# Patient Record
Sex: FEMALE | Race: WHITE | NOT HISPANIC OR LATINO | URBAN - METROPOLITAN AREA
[De-identification: names, ages, dates, MRNs, and addresses within clinical notes are randomized per-mention and may not be internally consistent; named-entity substitution may affect disease eponyms.]

---

## 2021-01-19 ENCOUNTER — NURSE TRIAGE (OUTPATIENT)
Dept: OTHER | Facility: OTHER | Age: 51
End: 2021-01-19

## 2021-01-19 DIAGNOSIS — Z20.822 EXPOSURE TO COVID-19 VIRUS: Primary | ICD-10-CM

## 2021-01-20 DIAGNOSIS — Z20.822 EXPOSURE TO COVID-19 VIRUS: ICD-10-CM

## 2021-01-20 PROCEDURE — U0005 INFEC AGEN DETEC AMPLI PROBE: HCPCS | Performed by: FAMILY MEDICINE

## 2021-01-20 PROCEDURE — U0003 INFECTIOUS AGENT DETECTION BY NUCLEIC ACID (DNA OR RNA); SEVERE ACUTE RESPIRATORY SYNDROME CORONAVIRUS 2 (SARS-COV-2) (CORONAVIRUS DISEASE [COVID-19]), AMPLIFIED PROBE TECHNIQUE, MAKING USE OF HIGH THROUGHPUT TECHNOLOGIES AS DESCRIBED BY CMS-2020-01-R: HCPCS | Performed by: FAMILY MEDICINE

## 2021-01-20 NOTE — TELEPHONE ENCOUNTER
Regarding: Covid Test - Symptomatic - Exposure   ----- Message from Leticia Martinez sent at 1/19/2021  7:13 PM EST -----    "I need a covid test"     Exposure was 1/16    "stuffy head"   Cough  Fatigue

## 2021-01-20 NOTE — TELEPHONE ENCOUNTER
Pt is requesting a covid test and does not have a [facility]'s PCP   Reports having an out of network PCP   Order placed   Pt informed of closest testing site and was advised of hours of operation, address, to wear a mask, and to stay in the car   Pt verbalized understanding       Link sent to pts email address to create a [facility] account to check for results  Reason for Disposition   [1] COVID-19 infection suspected by caller or triager AND [2] mild symptoms (cough, fever, or others) AND [1] no complications or SOB    Answer Assessment - Initial Assessment Questions  Were you within 6 feet or less, for up to 15 minutes or more with a person that has a confirmed COVID-19 test? 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 1/16 Saturday     Are you experiencing any symptoms attributed to the virus?  (Assess for SOB, cough, fever, difficulty breathing) stuffy head, mild productive cough, fatigue, mild loss of taste, body aches     HIGH RISK: Do you have any history heart or lung conditions, weakened immune system, diabetes, Asthma, CHF, HIV, COPD, Chemo, renal failure, sickle cell, etc? Denies     PREGNANCY: Are you pregnant or did you recently give birth?  N/A    Protocols used: CORONAVIRUS (COVID-19) DIAGNOSED OR SUSPECTED-ADULT-

## 2021-01-21 ENCOUNTER — TELEPHONE (OUTPATIENT)
Dept: OTHER | Facility: OTHER | Age: 51
End: 2021-01-21

## 2021-01-21 LAB — SARS-COV-2 RNA RESP QL NAA+PROBE: POSITIVE

## 2021-01-21 NOTE — TELEPHONE ENCOUNTER
Your test for the novel coronavirus, also known as COVID-19, was positive  The sample showed that the virus was present  Positive COVID-19 test results are reportable to the [place] Department of Health  You may receive a call from trained public health staff to conduct an interview  It is important to answer their call  They will ask you to verify who you are  During the call they will ask you about what symptoms you have, what you did before you got sick, and who you were close to while sick  The health department does this to make sure everyone stays healthy and to reduce the spread of the virus  If you would like to verify if the caller does in fact work in contact tracing, call the [address] [place] [address] at [employer] ([phone])  For additional information, please visit the [facility]  website: www health [place] gov     If you have any additional questions, we can schedule a virtual visit for you with a provider or call the [facility] hotline [phone], option 7, for care advice    For additional information, please visit the Coronavirus FAQ on the [facility] [place] home page ([facility]  org)

## 2023-01-24 ENCOUNTER — OPTICAL OFFICE (OUTPATIENT)
Dept: URBAN - NONMETROPOLITAN AREA CLINIC 4 | Facility: CLINIC | Age: 53
Setting detail: OPHTHALMOLOGY
End: 2023-01-24
Payer: COMMERCIAL

## 2023-01-24 ENCOUNTER — DOCTOR'S OFFICE (OUTPATIENT)
Dept: URBAN - NONMETROPOLITAN AREA CLINIC 1 | Facility: CLINIC | Age: 53
Setting detail: OPHTHALMOLOGY
End: 2023-01-24
Payer: COMMERCIAL

## 2023-01-24 DIAGNOSIS — H52.223: ICD-10-CM

## 2023-01-24 DIAGNOSIS — H52.4: ICD-10-CM

## 2023-01-24 PROBLEM — H04.123 DRY EYE SYNDROME LACRIMAL GLAND; BOTH EYES: Status: ACTIVE | Noted: 2023-01-24

## 2023-01-24 PROCEDURE — V2750 ANTI-REFLECTIVE COATING: HCPCS

## 2023-01-24 PROCEDURE — V2784 LENS POLYCARB OR EQUAL: HCPCS

## 2023-01-24 PROCEDURE — V2025 EYEGLASSES DELUX FRAMES: HCPCS

## 2023-01-24 PROCEDURE — 92015 DETERMINE REFRACTIVE STATE: CPT

## 2023-01-24 PROCEDURE — V2781 PROGRESSIVE LENS PER LENS: HCPCS

## 2023-01-24 PROCEDURE — V2020 VISION SVCS FRAMES PURCHASES: HCPCS

## 2023-01-24 PROCEDURE — V2799 MISC VISION ITEM OR SERVICE: HCPCS

## 2023-01-24 PROCEDURE — 92004 COMPRE OPH EXAM NEW PT 1/>: CPT

## 2023-01-24 ASSESSMENT — CONFRONTATIONAL VISUAL FIELD TEST (CVF)
OS_FINDINGS: FULL
OD_FINDINGS: FULL

## 2023-01-24 ASSESSMENT — REFRACTION_CURRENTRX
OS_CYLINDER: -1.50
OD_OVR_VA: 20/
OD_SPHERE: PLANO
OS_ADD: +2.25
OD_AXIS: 095
OS_AXIS: 085
OD_ADD: +2.25
OS_OVR_VA: 20/
OD_CYLINDER: -1.75
OS_SPHERE: +0.50

## 2023-01-24 ASSESSMENT — REFRACTION_MANIFEST
OS_VA1: 20/20-
OD_CYLINDER: -2.00
OD_VA1: 20/20-
OS_VA2: 20/20-
OS_ADD: +2.00
OD_ADD: +2.00
OS_CYLINDER: -1.75
OS_AXIS: 090
OS_SPHERE: +0.75
OU_VA: 20/20-
OD_VA2: 20/20-
OD_AXIS: 092
OD_SPHERE: +0.25

## 2023-01-24 ASSESSMENT — LID EXAM ASSESSMENTS
OS_COMMENTS: 2+ MGD
OD_COMMENTS: 2+ MGD

## 2023-01-24 ASSESSMENT — TONOMETRY
OD_IOP_MMHG: 20
OS_IOP_MMHG: 20

## 2023-01-24 ASSESSMENT — REFRACTION_AUTOREFRACTION
OD_SPHERE: +0.25
OD_CYLINDER: -1.75
OS_CYLINDER: -1.75
OS_AXIS: 089
OD_AXIS: 095
OS_SPHERE: +0.50

## 2023-01-24 ASSESSMENT — SPHEQUIV_DERIVED
OD_SPHEQUIV: -0.625
OS_SPHEQUIV: -0.375
OD_SPHEQUIV: -0.75
OS_SPHEQUIV: -0.125

## 2023-01-24 ASSESSMENT — VISUAL ACUITY
OD_BCVA: 20/20-2
OS_BCVA: 20/20-1

## 2023-01-24 ASSESSMENT — SUPERFICIAL PUNCTATE KERATITIS (SPK)
OD_SPK: T
OS_SPK: T

## 2025-05-12 NOTE — PROGRESS NOTES
" Nutrition Assessment Form    Patient Name: Leigh Cervantes    YOB: 1970    Sex: Female     Assessment Date: 5/20/2025  Start Time: 2:36 PM Stop Time: 3:32 PM Total Minutes: 56 min     Data:  Present at session: self   Parent/Patient Concerns/reason for visit: \"My LDL is high.\"    Medical Dx/Reason for Referral: Weight gain   No past medical history on file.   Weight gain  Mixed dyslipidemia  Vitamin D deficiency  Vitamin B12 deficiency  Hot flashes  Chronic venous HTN with inflammation b/l     No current outpatient medications on file.     No current facility-administered medications for this visit.         Biotin 2.5 MG Oral Capsule Take by mouth.   Vitamin D 125 MCG (5000 UT) Oral Capsule Take by mouth.   B-12 100 MCG Oral Tablet Take by mouth.   Magnesium 30 MG Oral Tablet Take by mouth.   FLUoxetine HCl 10 MG Oral Capsule (PROzac) Take 1 Cap by mouth daily. 90 Capsule 3     Stopped taking statin, reports feet feel better now that statin is stopped.      Additional Meds/Supplements: Zinc, black cohash (for hot flashes), omega 3 fish oil, creatine, coQ10   Special Learning Needs/barriers to learning/any new barriers N/a   Height: Ht Readings from Last 5 Encounters:   05/20/25 5' 3.75\" (1.619 m)      Weight: Wt Readings from Last 10 Encounters:   05/20/25 98.9 kg (218 lb)     Estimated body mass index is 37.71 kg/m² as calculated from the following:    Height as of this encounter: 5' 3.75\" (1.619 m).    Weight as of this encounter: 98.9 kg (218 lb).   Recent Weight Change: [x]Yes     []No  Amount: Noticed increased bloating and weight gain in abdominal region. Previous -180lb range several years ago.      Energy Needs: Hollenberg- St. Jeor Equation: 1535 - 1848 kcal (mifflin x 1.3 - 1.5 - 500 for 1 lb weight loss per week)   Not on File or intolerances   NKFA    Dislikes fish     Social History     Substance and Sexual Activity   Alcohol Use Not on file      1 light beer \"here and there, it's " "helpful to my stomach\" 1 to couple times per week   Social History     Tobacco Use   Smoking Status Not on file   Smokeless Tobacco Not on file      none   Who shops? patient and spouse   Who cooks/cooking methods/Eating out/take out habits   patient and spouse  Cooking methods: bake/huerta/air huerta/grill/boil-- grills on the weekends, typically hamburgers or chicken (avoids cheese if possible), pt sometimes does air fryer foods (\"but it's breaded chicken fingers)    Take out: once every 2 weeks, would have slice of veggie pizza salad  Dining out very seldom, maybe 2x per month, Stephanie or Mexican food   Exercise: Walks in [place], up/down a couple hills near her home.   Walking while at work.      Other: ie: Sleep habits/ stress level/ work habits household-lives with ?/ food security   Hot flashes interrupting her sleep, sometimes wakes up feeling warm. Takes cold showers, uses fan, etc to keep cool.        Prior Nutritional Counseling? []Yes     [x]No  When:      Why:         Diet Hx:  Breakfast: Diet: Avoids wine, spicy foods due to worsening hot flashes    2 cups of coffee in the morning with creamer    Avoiding bagels/cream cheese, feels on time crunch during this time. Does not typically have this meal due to feeling rushed. \"This has been a challenge.\"          Lunch: Avoiding sandwiches with chips now since getting the LDL levels back. May have homemade veggie bars or stuffed pepper casserole or salad with chicken.            Dinner: Caesar salad with caesar dressing  Baked potato and steamed broccoli  Chicken and hamburger primary meats she eats, if has fish would be breaded          Snacks: AM -   PM - nachos with queso and salsa, stopped this after 5/8/25 lab results  HS -    Other Notes/ Initial Assessment:  Works at hardware store. Reports trying to improve diet quality since getting back results of abnormal lipid panel on 5/8/25. She is particularly motivated to make diet changes \"If you tell me " "it's good for me to eat, I'll eat it.\"      Updated assessment (Follow up note only):           Nutrition Diagnosis:   Overweight/obesity  related to Excess energy intake as evidenced by  BMI more than normative standard for age and sex (obesity-grade II 35-39.9)       Any change or new dx since previous visit:     Medical Nutrition Therapy Intervention:  [x]Individualized Meal Plan--Discussed nutritious breakfast options that are quicker preparation such as overnight oats or make ahead breakfast sandwiches on whole wheat english muffins, etc. Encouraged including nutritious balanced breakfast in the morning to aid in portion control.    Discussed other methods of preparing fish to encourage consumption of these, omega 3 rich foods post menopause.     Pt may benefit from avoiding caffeine to address hot flashes, consider herbal teas instead of coffee in the morning at least for 3-4 weeks to see if this improves her symptoms.  []Understanding Lab Values   []Basic Pathophysiology of Disease [x]Food/Medication Interactions--Pt reports taking black cohosh for hot flashes. Due to limited evidence/clinical trials supporting use of black cohosh for hot flashes along with limited trials of long term effects, would not recommend use of this supplement from a nutrition standpoint.     ?need for zinc supplementation, as may not be appropriate for long-term use. Do not see serum zinc levels in chart.    [x]Food Diary--Discussed calorie tracking to aim in weight loss. Discussed goal of 1500 kcal daily. Pt to utilize lose it or myfitness pal apps.  [x]Exercise--Suggested pt utilize pedometer on phone or fitness watch/ring etc to track her typical daily steps. Suspect steps/activity may need to be increased.    []Lifestyle/Behavior Modification Techniques []Medication, Mechanism of Action   []Label Reading: CHO/ Na/ Fat/ other_________ []Self Blood Glucose Monitoring   []Weight/BMI Goals: gain/lose/maintain []Other -     Handouts " "provided: 1500 kcal 5 day sample meal plan, recipes for air fryer salmon, overnight oats          Comprehension: []Excellent  []Very Good  [x]Good  []Fair   []Poor    Receptivity: []Excellent  []Very Good  [x]Good  []Fair   []Poor    Expected Compliance: []Excellent  []Very Good  [x]Good  []Fair   []Poor        Goals (initial)/ Progress made on previous goals/new goals:  Pt to lose 1lb per week upon follow up.    2. Pt to include fish in her diet at least 1-2x per week upon follow up.    3. Pt to begin calorie tracking all meals/snacks daily upon follow up.        No follow-ups on file.  Labs:  CMP  No results found for: \"NA\", \"K\", \"CL\", \"CO2\", \"ANIONGAP\", \"BUN\", \"CREATININE\", \"GLUCOSE\", \"GLUF\", \"CALCIUM\", \"CORRECTEDCA\", \"AST\", \"ALT\", \"ALKPHOS\", \"PROT\", \"BILITOT\", \"EGFR\"    BMP  No results found for: \"GLUCOSE\", \"CALCIUM\", \"NA\", \"K\", \"CO2\", \"CL\", \"BUN\", \"CREATININE\"    Lipids    Outside labs: 5/8/25 TRIG 268, chol 260, HDL 45, , vitamin D 33, glucose 102, eGFR 104     Hemoglobin A1C  No results found for: \"HGBA1C\"    Fasting Glucose  No results found for: \"GLUF\"    Insulin     Thyroid  No results found for: \"TSH\", \"B1TKIGN\", \"N4UFSHQ\", \"THYROIDAB\"    Hepatic Function Panel  No results found for: \"ALT\", \"AST\", \"GGT\", \"ALKPHOS\", \"BILITOT\"    Celiac Disease Antibody Panel  No results found for: \"ENDOMYSIAL IGA\", \"GLIADIN IGA\", \"GLIADIN IGG\", \"IGA\", \"TISSUE TRANSGLUT AB\", \"TTG IGA\"   Iron  No results found for: \"IRON\", \"TIBC\", \"FERRITIN\"         Jasmyn Wood RD, LDN  [facility] CLINICAL NUTRITION SERVICES  [address] [place] [address] [address] [place] [phone]    "

## 2025-05-20 ENCOUNTER — CLINICAL SUPPORT (OUTPATIENT)
Dept: NUTRITION | Facility: CLINIC | Age: 55
End: 2025-05-20
Payer: COMMERCIAL

## 2025-05-20 VITALS — WEIGHT: 218 LBS | BODY MASS INDEX: 37.22 KG/M2 | HEIGHT: 64 IN

## 2025-05-20 DIAGNOSIS — R63.5 WEIGHT GAIN: Primary | ICD-10-CM

## 2025-05-20 PROCEDURE — 97802 MEDICAL NUTRITION INDIV IN: CPT | Performed by: DIETITIAN, REGISTERED

## 2025-07-23 ENCOUNTER — EVALUATION (OUTPATIENT)
Dept: PHYSICAL THERAPY | Facility: CLINIC | Age: 55
End: 2025-07-23
Payer: COMMERCIAL

## 2025-07-23 DIAGNOSIS — H81.13 BENIGN PAROXYSMAL POSITIONAL VERTIGO, BILATERAL: Primary | ICD-10-CM

## 2025-07-23 PROCEDURE — 97162 PT EVAL MOD COMPLEX 30 MIN: CPT | Performed by: PHYSICAL THERAPIST

## 2025-07-23 PROCEDURE — 95992 CANALITH REPOSITIONING PROC: CPT | Performed by: PHYSICAL THERAPIST

## 2025-07-23 PROCEDURE — 97110 THERAPEUTIC EXERCISES: CPT | Performed by: PHYSICAL THERAPIST

## 2025-07-23 NOTE — PROGRESS NOTES
"PT Evaluation     Today's date: 2025  Patient name: Leigh Cervantes  : 1970  MRN: [id number]  Referring provider: Savannah Gotti CRNP  Dx:   Encounter Diagnosis     ICD-10-CM    1. Benign paroxysmal positional vertigo, bilateral  H81.13                      Assessment  Impairments: abnormal coordination, abnormal gait, abnormal movement, activity intolerance, impaired balance, lacks appropriate home exercise program and safety issue  Symptom irritability: high    Assessment details: Pt. Is a pleasant 54 y.o. female presenting to PT with cc of acute dizziness since   . she visited PCP and was given antivert and referred to PT for vertigo. Precautions/contraindications for treatment/testing reviewed with patient. Epley maneuver was provided after leander-hallpike testing revealed ss of B posterior canalithiasis. Pt. Notes \"fuzzy\" feeling following but symptoms reduced in 10 mins. Pt. Was educated on precautions for next 24 hours including avoidance of bending positions and sleeping with head elevated. Pt. To f/u in 1-3 days for testing and HEP.       Understanding of Dx/Px/POC: excellent     Prognosis: excellent    Goals  ST days  Pt. Will report reduction of symptoms by approx 90% in 2 days.  Pt. Will report ability to perform all bed mobility WNL in 2 days.    LT weeks  -Pt. Will be I with HEP including Bennett Daroff maneuver  -Pt. Will demonstrate FOTO impairment score of <10%.         Plan  Patient would benefit from: skilled physical therapy    Planned therapy interventions: balance, canalith repositioning, neuromuscular re-education, graded exercise, graded motor, home exercise program, patient education, postural training, strengthening, stretching, therapeutic activities, therapeutic training, therapeutic exercise, gait training and graded activity    Frequency: 1-2x week  Duration in weeks: 4  Treatment plan discussed with: patient        Subjective Evaluation    History of " Present Illness  Mechanism of injury: Leigh Cervantes presents to PT with cc of acute vertiginous symptoms for past 4 days. She notes that Saturday morning she was bending her head under the bathrub faucet to wet her hair and experienced intense dizziness. She has had BPPV 10 years ago so she immediately tried doing her HEP which she felt made symptoms worse and caused her to vomit. She visited PCP on Monday and received meclizine and was referred to PT. She notes inability to rol lto R side, but feels L is sometimes symptomatic as well.     Patient Goals  Patient goals for therapy: improved balance    Treatments  Current treatment: medication      Objective     Concurrent Complaints  Positive for aural fullness. Negative for nausea/motion sickness, tinnitus, visual change, hearing loss, memory loss, poor concentration and peripheral neuropathy  Neuro Exam:     Dizziness  Positive for disequilibrium, vertigo, motion sickness and floating or swimming.   Negative for oscillopsia, light-headedness, rocking or swaying and diplopia.     Exacerbating factors  Positive for bending over, rolling in bed, looking up, turning head and supine to/from sitting.   Negative for walking, optokinetic movement and walking in busy environment.     Oculomotor exam   Gaze holding nystagmus: not present left  and not present right  Smooth pursuits: within normal limits  Vertical saccades: normal  Horizontal saccades: normal    Positional testing   Raffi-Hallpike   Left posterior canal: symptomatic, torsional and upbeating  Right posterior canal: symptomatic, torsional and upbeating             Precautions: None     Daily Treatment Diary:      Initial Evaluation Date: 07/23/25  Compliance 7/23                     Visit Number 1                    Re-Eval  IE                 MC   Foto Captured Y                           7/23                     Manual                      Montrose hallpike (+) B                     Epley 2x                                            Ther-Ex                      education 15'                                                                                                                                                                                                                           Neuro Re-Ed                                                                                                Ther-Act                                                               Modalities

## 2025-07-28 ENCOUNTER — OFFICE VISIT (OUTPATIENT)
Dept: PHYSICAL THERAPY | Facility: CLINIC | Age: 55
End: 2025-07-28
Payer: COMMERCIAL

## 2025-07-28 DIAGNOSIS — H81.13 BENIGN PAROXYSMAL POSITIONAL VERTIGO, BILATERAL: Primary | ICD-10-CM

## 2025-07-28 PROCEDURE — 97112 NEUROMUSCULAR REEDUCATION: CPT | Performed by: PHYSICAL THERAPIST

## 2025-07-28 PROCEDURE — 97110 THERAPEUTIC EXERCISES: CPT | Performed by: PHYSICAL THERAPIST
